# Patient Record
Sex: FEMALE | Race: WHITE | NOT HISPANIC OR LATINO
[De-identification: names, ages, dates, MRNs, and addresses within clinical notes are randomized per-mention and may not be internally consistent; named-entity substitution may affect disease eponyms.]

---

## 2018-10-07 ENCOUNTER — TRANSCRIPTION ENCOUNTER (OUTPATIENT)
Age: 23
End: 2018-10-07

## 2021-10-29 PROBLEM — Z00.00 ENCOUNTER FOR PREVENTIVE HEALTH EXAMINATION: Status: ACTIVE | Noted: 2021-10-29

## 2021-11-01 ENCOUNTER — APPOINTMENT (OUTPATIENT)
Dept: PEDIATRIC CARDIOLOGY | Facility: CLINIC | Age: 26
End: 2021-11-01
Payer: COMMERCIAL

## 2021-11-01 PROCEDURE — 76821 MIDDLE CEREBRAL ARTERY ECHO: CPT

## 2021-11-01 PROCEDURE — 76820 UMBILICAL ARTERY ECHO: CPT

## 2021-11-01 PROCEDURE — 99203 OFFICE O/P NEW LOW 30 MIN: CPT

## 2021-11-01 PROCEDURE — 93325 DOPPLER ECHO COLOR FLOW MAPG: CPT | Mod: 59

## 2021-11-01 PROCEDURE — 76825 ECHO EXAM OF FETAL HEART: CPT

## 2021-11-01 PROCEDURE — 76827 ECHO EXAM OF FETAL HEART: CPT

## 2022-04-03 ENCOUNTER — INPATIENT (INPATIENT)
Facility: HOSPITAL | Age: 27
LOS: 0 days | Discharge: HOME | End: 2022-04-04
Attending: OBSTETRICS & GYNECOLOGY | Admitting: OBSTETRICS & GYNECOLOGY

## 2022-04-03 ENCOUNTER — TRANSCRIPTION ENCOUNTER (OUTPATIENT)
Age: 27
End: 2022-04-03

## 2022-04-03 VITALS — DIASTOLIC BLOOD PRESSURE: 73 MMHG | HEART RATE: 95 BPM | SYSTOLIC BLOOD PRESSURE: 117 MMHG

## 2022-04-03 DIAGNOSIS — K08.409 PARTIAL LOSS OF TEETH, UNSPECIFIED CAUSE, UNSPECIFIED CLASS: Chronic | ICD-10-CM

## 2022-04-03 DIAGNOSIS — Z3A.00 WEEKS OF GESTATION OF PREGNANCY NOT SPECIFIED: ICD-10-CM

## 2022-04-03 DIAGNOSIS — O26.899 OTHER SPECIFIED PREGNANCY RELATED CONDITIONS, UNSPECIFIED TRIMESTER: ICD-10-CM

## 2022-04-03 LAB
BASOPHILS # BLD AUTO: 0.01 K/UL — SIGNIFICANT CHANGE UP (ref 0–0.2)
BASOPHILS NFR BLD AUTO: 0.1 % — SIGNIFICANT CHANGE UP (ref 0–2)
BLD GP AB SCN SERPL QL: NEGATIVE — SIGNIFICANT CHANGE UP
COVID-19 SPIKE DOMAIN AB INTERP: POSITIVE
COVID-19 SPIKE DOMAIN ANTIBODY RESULT: >250 U/ML — HIGH
EOSINOPHIL # BLD AUTO: 0.01 K/UL — SIGNIFICANT CHANGE UP (ref 0–0.5)
EOSINOPHIL NFR BLD AUTO: 0.1 % — SIGNIFICANT CHANGE UP (ref 0–6)
HCT VFR BLD CALC: 34.5 % — SIGNIFICANT CHANGE UP (ref 34.5–45)
HGB BLD-MCNC: 11.4 G/DL — LOW (ref 11.5–15.5)
IANC: 7.62 K/UL — HIGH (ref 1.8–7.4)
IMM GRANULOCYTES NFR BLD AUTO: 0.4 % — SIGNIFICANT CHANGE UP (ref 0–1.5)
LYMPHOCYTES # BLD AUTO: 1.2 K/UL — SIGNIFICANT CHANGE UP (ref 1–3.3)
LYMPHOCYTES # BLD AUTO: 13 % — SIGNIFICANT CHANGE UP (ref 13–44)
MCHC RBC-ENTMCNC: 27.1 PG — SIGNIFICANT CHANGE UP (ref 27–34)
MCHC RBC-ENTMCNC: 33 GM/DL — SIGNIFICANT CHANGE UP (ref 32–36)
MCV RBC AUTO: 81.9 FL — SIGNIFICANT CHANGE UP (ref 80–100)
MONOCYTES # BLD AUTO: 0.38 K/UL — SIGNIFICANT CHANGE UP (ref 0–0.9)
MONOCYTES NFR BLD AUTO: 4.1 % — SIGNIFICANT CHANGE UP (ref 2–14)
NEUTROPHILS # BLD AUTO: 7.62 K/UL — HIGH (ref 1.8–7.4)
NEUTROPHILS NFR BLD AUTO: 82.3 % — HIGH (ref 43–77)
NRBC # BLD: 0 /100 WBCS — SIGNIFICANT CHANGE UP
NRBC # FLD: 0 K/UL — SIGNIFICANT CHANGE UP
PLATELET # BLD AUTO: 167 K/UL — SIGNIFICANT CHANGE UP (ref 150–400)
RBC # BLD: 4.21 M/UL — SIGNIFICANT CHANGE UP (ref 3.8–5.2)
RBC # FLD: 13.9 % — SIGNIFICANT CHANGE UP (ref 10.3–14.5)
RH IG SCN BLD-IMP: POSITIVE — SIGNIFICANT CHANGE UP
RH IG SCN BLD-IMP: POSITIVE — SIGNIFICANT CHANGE UP
SARS-COV-2 IGG+IGM SERPL QL IA: >250 U/ML — HIGH
SARS-COV-2 IGG+IGM SERPL QL IA: POSITIVE
SARS-COV-2 RNA SPEC QL NAA+PROBE: SIGNIFICANT CHANGE UP
WBC # BLD: 9.26 K/UL — SIGNIFICANT CHANGE UP (ref 3.8–10.5)
WBC # FLD AUTO: 9.26 K/UL — SIGNIFICANT CHANGE UP (ref 3.8–10.5)

## 2022-04-03 RX ORDER — OXYCODONE HYDROCHLORIDE 5 MG/1
5 TABLET ORAL ONCE
Refills: 0 | Status: DISCONTINUED | OUTPATIENT
Start: 2022-04-03 | End: 2022-04-04

## 2022-04-03 RX ORDER — ACETAMINOPHEN 500 MG
3 TABLET ORAL
Qty: 0 | Refills: 0 | DISCHARGE
Start: 2022-04-03

## 2022-04-03 RX ORDER — MAGNESIUM HYDROXIDE 400 MG/1
30 TABLET, CHEWABLE ORAL
Refills: 0 | Status: DISCONTINUED | OUTPATIENT
Start: 2022-04-03 | End: 2022-04-04

## 2022-04-03 RX ORDER — OXYTOCIN 10 UNIT/ML
20 VIAL (ML) INJECTION ONCE
Refills: 0 | Status: COMPLETED | OUTPATIENT
Start: 2022-04-03 | End: 2022-04-03

## 2022-04-03 RX ORDER — OXYTOCIN 10 UNIT/ML
333.33 VIAL (ML) INJECTION
Qty: 20 | Refills: 0 | Status: DISCONTINUED | OUTPATIENT
Start: 2022-04-03 | End: 2022-04-04

## 2022-04-03 RX ORDER — HYDROCORTISONE 1 %
1 OINTMENT (GRAM) TOPICAL EVERY 6 HOURS
Refills: 0 | Status: DISCONTINUED | OUTPATIENT
Start: 2022-04-03 | End: 2022-04-04

## 2022-04-03 RX ORDER — OXYCODONE HYDROCHLORIDE 5 MG/1
5 TABLET ORAL
Refills: 0 | Status: DISCONTINUED | OUTPATIENT
Start: 2022-04-03 | End: 2022-04-04

## 2022-04-03 RX ORDER — OXYTOCIN 10 UNIT/ML
VIAL (ML) INJECTION
Qty: 20 | Refills: 0 | Status: DISCONTINUED | OUTPATIENT
Start: 2022-04-03 | End: 2022-04-03

## 2022-04-03 RX ORDER — SODIUM CHLORIDE 9 MG/ML
1000 INJECTION, SOLUTION INTRAVENOUS
Refills: 0 | Status: DISCONTINUED | OUTPATIENT
Start: 2022-04-03 | End: 2022-04-03

## 2022-04-03 RX ORDER — PRAMOXINE HYDROCHLORIDE 150 MG/15G
1 AEROSOL, FOAM RECTAL EVERY 4 HOURS
Refills: 0 | Status: DISCONTINUED | OUTPATIENT
Start: 2022-04-03 | End: 2022-04-04

## 2022-04-03 RX ORDER — DIBUCAINE 1 %
1 OINTMENT (GRAM) RECTAL EVERY 6 HOURS
Refills: 0 | Status: DISCONTINUED | OUTPATIENT
Start: 2022-04-03 | End: 2022-04-04

## 2022-04-03 RX ORDER — KETOROLAC TROMETHAMINE 30 MG/ML
30 SYRINGE (ML) INJECTION ONCE
Refills: 0 | Status: DISCONTINUED | OUTPATIENT
Start: 2022-04-03 | End: 2022-04-04

## 2022-04-03 RX ORDER — ACETAMINOPHEN 500 MG
975 TABLET ORAL
Refills: 0 | Status: DISCONTINUED | OUTPATIENT
Start: 2022-04-03 | End: 2022-04-04

## 2022-04-03 RX ORDER — LANOLIN
1 OINTMENT (GRAM) TOPICAL EVERY 6 HOURS
Refills: 0 | Status: DISCONTINUED | OUTPATIENT
Start: 2022-04-03 | End: 2022-04-04

## 2022-04-03 RX ORDER — BENZOCAINE 10 %
1 GEL (GRAM) MUCOUS MEMBRANE EVERY 6 HOURS
Refills: 0 | Status: DISCONTINUED | OUTPATIENT
Start: 2022-04-03 | End: 2022-04-04

## 2022-04-03 RX ORDER — DIPHENHYDRAMINE HCL 50 MG
25 CAPSULE ORAL EVERY 6 HOURS
Refills: 0 | Status: DISCONTINUED | OUTPATIENT
Start: 2022-04-03 | End: 2022-04-04

## 2022-04-03 RX ORDER — TETANUS TOXOID, REDUCED DIPHTHERIA TOXOID AND ACELLULAR PERTUSSIS VACCINE, ADSORBED 5; 2.5; 8; 8; 2.5 [IU]/.5ML; [IU]/.5ML; UG/.5ML; UG/.5ML; UG/.5ML
0.5 SUSPENSION INTRAMUSCULAR ONCE
Refills: 0 | Status: DISCONTINUED | OUTPATIENT
Start: 2022-04-03 | End: 2022-04-04

## 2022-04-03 RX ORDER — AER TRAVELER 0.5 G/1
1 SOLUTION RECTAL; TOPICAL EVERY 4 HOURS
Refills: 0 | Status: DISCONTINUED | OUTPATIENT
Start: 2022-04-03 | End: 2022-04-04

## 2022-04-03 RX ORDER — SODIUM CHLORIDE 9 MG/ML
3 INJECTION INTRAMUSCULAR; INTRAVENOUS; SUBCUTANEOUS EVERY 8 HOURS
Refills: 0 | Status: DISCONTINUED | OUTPATIENT
Start: 2022-04-03 | End: 2022-04-04

## 2022-04-03 RX ORDER — SIMETHICONE 80 MG/1
80 TABLET, CHEWABLE ORAL EVERY 4 HOURS
Refills: 0 | Status: DISCONTINUED | OUTPATIENT
Start: 2022-04-03 | End: 2022-04-04

## 2022-04-03 RX ORDER — IBUPROFEN 200 MG
600 TABLET ORAL EVERY 6 HOURS
Refills: 0 | Status: DISCONTINUED | OUTPATIENT
Start: 2022-04-03 | End: 2022-04-04

## 2022-04-03 RX ORDER — IBUPROFEN 200 MG
600 TABLET ORAL EVERY 6 HOURS
Refills: 0 | Status: COMPLETED | OUTPATIENT
Start: 2022-04-03 | End: 2023-03-02

## 2022-04-03 RX ORDER — IBUPROFEN 200 MG
1 TABLET ORAL
Qty: 0 | Refills: 0 | DISCHARGE
Start: 2022-04-03

## 2022-04-03 RX ADMIN — Medication 1000 MILLIUNIT(S)/MIN: at 12:55

## 2022-04-03 RX ADMIN — SODIUM CHLORIDE 3 MILLILITER(S): 9 INJECTION INTRAMUSCULAR; INTRAVENOUS; SUBCUTANEOUS at 23:06

## 2022-04-03 RX ADMIN — Medication 20 UNIT(S): at 13:00

## 2022-04-03 RX ADMIN — Medication 975 MILLIGRAM(S): at 19:26

## 2022-04-03 RX ADMIN — SODIUM CHLORIDE 3 MILLILITER(S): 9 INJECTION INTRAMUSCULAR; INTRAVENOUS; SUBCUTANEOUS at 15:34

## 2022-04-03 RX ADMIN — Medication 975 MILLIGRAM(S): at 18:36

## 2022-04-03 NOTE — OB PROVIDER H&P - ASSESSMENT
This is a 26 year old  at 40.3 weeks gestational age admitted for labor    plan discussed with dr skye gregory  admit for labor  approved for epidural prn  routine orders  covid swab collected and sent

## 2022-04-03 NOTE — OB RN DELIVERY SUMMARY - NS_SEPSISRSKCALC_OBGYN_ALL_OB_FT
EOS calculated successfully. EOS Risk Factor: 0.5/1000 live births ([facility] national incidence); GA=40w3d; Temp=98.2; ROM=1.9; GBS='Negative'; Antibiotics='No antibiotics or any antibiotics < 2 hrs prior to birth'

## 2022-04-03 NOTE — OB RN TRIAGE NOTE - FALL HARM RISK - UNIVERSAL INTERVENTIONS
Bed in lowest position, wheels locked, appropriate side rails in place/Call bell, personal items and telephone in reach/Instruct patient to call for assistance before getting out of bed or chair/Non-slip footwear when patient is out of bed/[place] to call system/Physically safe environment - no spills, clutter or unnecessary equipment/Purposeful Proactive Rounding/Room/bathroom lighting operational, light cord in reach

## 2022-04-03 NOTE — CHART NOTE - NSCHARTNOTEFT_GEN_A_CORE
This is a 26 year old  at 40.3 weeks gestational age admitted for labor. Patient does not desire epidural, states " she wants to try without"    O:  SROM clear fluid at 0822  sve 9.5/100/-1  NST: 130 baseline, moderate variability, positive accels, no decels, strong contractions q 2-3 minutes    A/P  Dr Ervin gregory notified  delivery set up in DT2 This is a 26 year old  at 40.3 weeks gestational age admitted for labor. Patient does not desire epidural, states " she wants to try without". Pt laboring with support person at bedside. no VB.    O:  Vital Signs Last 24 Hrs  T(C): 36.8 (2022 07:52), Max: 36.8 (2022 07:52)  T(F): 98.2 (2022 07:52), Max: 98.2 (2022 07:52)  HR: 95 (2022 07:52) (95 - 95)  BP: 117/73 (2022 07:52) (117/73 - 117/73)  BP(mean): --  RR: 14 (2022 07:52) (14 - 18)  SpO2: 98% (2022 07:52) (98% - 98%)    SROM clear fluid at 0822  sve 9.5/100/-1 pt bearing down, instructed not to push before completely dilated  NST: 130 baseline, moderate variability, positive accels, no decels, strong contractions q 2-3 minutes    A/P  Dr Ervin gregory notified  delivery set up in DT2  expectant management  anticipate

## 2022-04-03 NOTE — DISCHARGE NOTE OB - PATIENT PORTAL LINK FT
You can access the FollowMyHealth Patient Portal offered by [facility] by registering at the following website: [email or web address]. By joining [facility]’s FollowMyHealth portal, you will also be able to view your health information using other applications (apps) compatible with our system.

## 2022-04-03 NOTE — OB PROVIDER TRIAGE NOTE - NSHPPHYSICALEXAM_GEN_ALL_CORE
Vital Signs Last 24 Hrs  T(C): 36.4 (03 Apr 2022 06:56), Max: 36.4 (03 Apr 2022 06:56)  T(F): 97.5 (03 Apr 2022 06:56), Max: 97.5 (03 Apr 2022 06:56)  HR: 95 (03 Apr 2022 06:56) (95 - 95)  BP: 117/73 (03 Apr 2022 06:56) (117/73 - 117/73)  BP(mean): --  RR: 18 (03 Apr 2022 06:56) (18 - 18)  SpO2: --    A&O x3  CTAB  abdomen: gravid, soft, nontender  SVE 6/80/-3  NST: 135 baseline, moderate variability, positive accels, no decels  TAS: vtx confirmed

## 2022-04-03 NOTE — DISCHARGE NOTE OB - CARE PROVIDER_API CALL
Meghann Ernst  OBSTETRICS AND GYNECOLOGY  [address], [address]  [place] [address]  Phone: [phone]  Fax: [phone]  Follow Up Time:

## 2022-04-03 NOTE — OB RN PATIENT PROFILE - VISION (WITH CORRECTIVE LENSES IF THE PATIENT USUALLY WEARS THEM):
pt c/o intermittent fever, nose bleed for about 2 months. blood work was done today, called in by PMD for abnormal result. pale appearance. pt is alert, awake and orientedx3. no pmh, IUTD. apical HR auscultated. no medication given PTA. Normal vision: sees adequately in most situations; can see medication labels, newsprint

## 2022-04-03 NOTE — OB PROVIDER DELIVERY SUMMARY - NSSELHIDDEN_OBGYN_ALL_OB_FT
[NS_DeliveryAttending1_OBGYN_ALL_OB_FT:[id number]=],[NS_DeliveryAssist1_OBGYN_ALL_OB_FT:[id number]=]

## 2022-04-03 NOTE — OB RN DELIVERY SUMMARY - NSSELHIDDEN_OBGYN_ALL_OB_FT
[NS_DeliveryAttending1_OBGYN_ALL_OB_FT:[id number]=],[NS_DeliveryAssist1_OBGYN_ALL_OB_FT:[id number]=],[NS_DeliveryRN_OBGYN_ALL_OB_FT:[id number]=],[NS_CirculateRN2_OBGYN_ALL_OB_FT:[id number]=]

## 2022-04-03 NOTE — OB PROVIDER H&P - NS ATTEND AMEND GEN_ALL_CORE FT
Attending Noite    at  40+ weeks presents in active labor   SVE /-1  Admit in labor   epidural prn     R Natividad LORENZO

## 2022-04-03 NOTE — OB PROVIDER TRIAGE NOTE - HISTORY OF PRESENT ILLNESS
This is a 26 year old  at 40.3 weeks gestational age presents with  This is a 26 year old  at 40.3 weeks gestational age presents with painful contractions q 3 minutes, does not require epidural at this time. Reports +GFM, denies LOF, VB.  AP course uncomplicated as per patient  denies recent covid illness, reports + covid vaccination  GBS neg     med: bicuspid aortic valve, being monitored  surg: wisdom teeth  gyn: denies  ob: denies  current meds: none  NKDA

## 2022-04-03 NOTE — OB PROVIDER DELIVERY SUMMARY - NSPROVIDERDELIVERYNOTE_OBGYN_ALL_OB_FT
Spontaneous vaginal delivery of liveborn infant from REUBEN position. Head, shoulders, and body delivered easily. Infant was suctioned. No mec. 1 minute delayed cord clamping was performed. Cord clamped and cut and infant passed to mother. Cord gases obtained. Placenta delivered intact with a 3 vessel cord. Fundal massage was given and uterine fundus was found to be intermittently boggy. Pitocin was run through a pressure bag, intrautine massage performed, and uterus was found to be firm. Vaginal exam revealed an intact cervix, vaginal walls, and sulci. Patient had bilateral periurethral lacerations and a 2nd degree laceration in the perineum that were repaired with 2.0 chromic suture. Excellent hemostasis was noted. Patient was stable and went to recovery. Count was correct x2. Spontaneous vaginal delivery of liveborn infant from REUBEN position. Head, shoulders, and body delivered easily. Infant was suctioned. No mec. 1 minute delayed cord clamping was performed. Cord clamped and cut and infant passed to mother. Cord gases obtained. Placenta delivered intact with a 3 vessel cord. Fundal massage was given and uterine fundus was found to be intermittently boggy. Pitocin was run through a pressure bag, intrauteriine massage performed, and uterus was found to be firm. Vaginal exam revealed an intact cervix, vaginal walls, and sulci. Patient had bilateral periurethral lacerations and a 2nd degree laceration in the perineum that were repaired with 2.0 chromic suture. Excellent hemostasis was noted. Patient was stable and went to recovery. Count was correct x2. Rectal exam intact after delivery

## 2022-04-03 NOTE — OB PROVIDER H&P - HISTORY OF PRESENT ILLNESS
This is a 26 year old  at 40.3 weeks gestational age presents with painful contractions q 3 minutes, does not require epidural at this time. Reports +GFM, denies LOF, VB.  AP course uncomplicated as per patient  denies recent covid illness, reports + covid vaccination  GBS neg     med: bicuspid aortic valve, being monitored  surg: wisdom teeth  gyn: denies  ob: denies  current meds: none  NKDA

## 2022-04-03 NOTE — DISCHARGE NOTE OB - NS MD DC FALL RISK RISK
For information on Fall & Injury Prevention, visit: [email or web address] OR  [email or web address] OR  https://www.cdc.gov/steadi/patient.html

## 2022-04-03 NOTE — DISCHARGE NOTE OB - CARE PLAN
Principal Discharge DX:	Vaginal delivery  Assessment and plan of treatment:	nothing in the vagina x 6 weeks   1

## 2022-04-03 NOTE — DISCHARGE NOTE OB - MEDICATION SUMMARY - MEDICATIONS TO TAKE
I will START or STAY ON the medications listed below when I get home from the hospital:    acetaminophen 325 mg oral tablet  -- 3 tab(s) by mouth every 8 hours  -- Indication: For for pain    ibuprofen 600 mg oral tablet  -- 1 tab(s) by mouth every 6 hours  -- Indication: For for pain    Prenatal Multivitamins with Folic Acid 1 mg oral tablet  -- 1 tab(s) by mouth once a day  -- Indication: For for vitamins

## 2022-04-03 NOTE — OB PROVIDER DELIVERY SUMMARY - NSPPHRISKEVAL_OBGYN_ALL_OB
Active bleeding Over distended uterus (polyhydramnios, macrosomia, multiple gestation)/Large for gestational age /Atonic uterus

## 2022-04-03 NOTE — DISCHARGE NOTE OB - MATERIALS PROVIDED
[facility] [place] Screening Program/[place]  Immunization Record/Breastfeeding Log/Guide to Postpartum Care/[facility] Hearing Screen Program/Shaken Baby Prevention Handout/Birth Certificate Instructions

## 2022-04-03 NOTE — OB RN PATIENT PROFILE - ALERT: PERTINENT HISTORY
1st Trimester Sonogram/20 Week Level II Sonogram Fetal Sonogram/1st Trimester Sonogram/20 Week Level II Sonogram

## 2022-04-04 VITALS
OXYGEN SATURATION: 99 % | RESPIRATION RATE: 17 BRPM | SYSTOLIC BLOOD PRESSURE: 120 MMHG | TEMPERATURE: 98 F | DIASTOLIC BLOOD PRESSURE: 79 MMHG | HEART RATE: 76 BPM

## 2022-04-04 LAB — T PALLIDUM AB TITR SER: NEGATIVE — SIGNIFICANT CHANGE UP

## 2022-04-04 RX ADMIN — Medication 600 MILLIGRAM(S): at 09:11

## 2022-04-04 RX ADMIN — Medication 1 TABLET(S): at 11:56

## 2022-04-04 RX ADMIN — MAGNESIUM HYDROXIDE 30 MILLILITER(S): 400 TABLET, CHEWABLE ORAL at 05:54

## 2022-04-04 RX ADMIN — Medication 975 MILLIGRAM(S): at 11:56

## 2022-04-04 RX ADMIN — Medication 600 MILLIGRAM(S): at 09:54

## 2022-04-04 NOTE — PROGRESS NOTE ADULT - SUBJECTIVE AND OBJECTIVE BOX
S: Patient doing well.   Pain controlled.  +OOB.  +void.  Tolerating PO.  Lochia WNL.    O: Vital Signs Last 24 Hrs  T(C): 36.6 (2022 05:37), Max: 36.7 (2022 18:03)  T(F): 97.9 (2022 05:37), Max: 98.1 (2022 18:03)  HR: 85 (2022 05:37) (85 - 104)  BP: 102/60 (2022 05:37) (102/60 - 116/78)  BP(mean): --  RR: 18 (2022 05:37) (17 - 18)  SpO2: 98% (2022 05:37) (98% - 100%)    Gen: NAD  Abd: soft, NT, ND.   fundus firm below umbilicus, NT.  Ext: no tenderness B/L, negative Deena's sign    Labs:                        11.4   9.26  )-----------( 167      ( 2022 08:21 )             34.5       ABO Interpretation: A ( @ 08:10)    Rh Interpretation: Positive ( @ 08:10)    Antibody Screen Negative            A: 26y PPD#1 s/p  doing well.   -Continue routine postpartum care.  -Discharge planning.     MD Gabe

## 2022-05-06 NOTE — OB PROVIDER H&P - NS ATTEND OPT1A GEN_ALL_CORE
Discharge instructions reviewed with patient and family member. Patient and family verbalized understanding. All home medications have been reviewed, questions answered and patient voiced understanding. All medication side effects reviewed and patient and family verbalized understanding. Follow up appointment(s) reviewed with patient and all attempts made to schedule within 7-10 days of discharge. Patient given prescriptions, discharge instructions, and appointment times. Patient discharged to home with family via private car. Taken to lobby via wheelchair. Medical decision making

## 2023-08-31 NOTE — OB RN TRIAGE NOTE - NS_MODEOFARRIVAL_OBGYN_ALL_OB
Car Pt is a 89 year old Female with a past medical history of Afib on eliquis, MDT PPM, remote CVA 2019,  HTN, CHF, non-hodgkin's lymphoma, and new asthma presents to the ED with worsening SOB and wheezing, found to have sepsis secondary to PNA and afib with RVR.

## 2024-01-18 ENCOUNTER — APPOINTMENT (OUTPATIENT)
Dept: ANTEPARTUM | Facility: CLINIC | Age: 29
End: 2024-01-18
Payer: MEDICARE

## 2024-01-18 ENCOUNTER — APPOINTMENT (OUTPATIENT)
Dept: ANTEPARTUM | Facility: CLINIC | Age: 29
End: 2024-01-18

## 2024-01-18 ENCOUNTER — ASOB RESULT (OUTPATIENT)
Age: 29
End: 2024-01-18

## 2024-01-18 PROCEDURE — 76801 OB US < 14 WKS SINGLE FETUS: CPT

## 2024-01-18 PROCEDURE — 76813 OB US NUCHAL MEAS 1 GEST: CPT

## 2024-03-14 ENCOUNTER — APPOINTMENT (OUTPATIENT)
Dept: ANTEPARTUM | Facility: CLINIC | Age: 29
End: 2024-03-14
Payer: COMMERCIAL

## 2024-03-14 ENCOUNTER — ASOB RESULT (OUTPATIENT)
Age: 29
End: 2024-03-14

## 2024-03-14 PROCEDURE — 76811 OB US DETAILED SNGL FETUS: CPT

## 2024-04-24 PROBLEM — Q23.1 CONGENITAL INSUFFICIENCY OF AORTIC VALVE: Chronic | Status: ACTIVE | Noted: 2022-04-03

## 2024-08-05 ENCOUNTER — INPATIENT (INPATIENT)
Facility: HOSPITAL | Age: 29
LOS: 0 days | Discharge: HOME | End: 2024-08-06
Attending: OBSTETRICS & GYNECOLOGY | Admitting: OBSTETRICS & GYNECOLOGY

## 2024-08-05 ENCOUNTER — APPOINTMENT (OUTPATIENT)
Dept: ANTEPARTUM | Facility: CLINIC | Age: 29
End: 2024-08-05

## 2024-08-05 VITALS — DIASTOLIC BLOOD PRESSURE: 73 MMHG | SYSTOLIC BLOOD PRESSURE: 111 MMHG | HEART RATE: 93 BPM

## 2024-08-05 DIAGNOSIS — O26.899 OTHER SPECIFIED PREGNANCY RELATED CONDITIONS, UNSPECIFIED TRIMESTER: ICD-10-CM

## 2024-08-05 DIAGNOSIS — O42.10 PREMATURE RUPTURE OF MEMBRANES, ONSET OF LABOR MORE THAN 24 HOURS FOLLOWING RUPTURE, UNSPECIFIED WEEKS OF GESTATION: ICD-10-CM

## 2024-08-05 DIAGNOSIS — K08.409 PARTIAL LOSS OF TEETH, UNSPECIFIED CAUSE, UNSPECIFIED CLASS: Chronic | ICD-10-CM

## 2024-08-05 LAB
BASOPHILS # BLD AUTO: 0.01 K/UL — SIGNIFICANT CHANGE UP (ref 0–0.2)
BASOPHILS NFR BLD AUTO: 0.1 % — SIGNIFICANT CHANGE UP (ref 0–2)
BLD GP AB SCN SERPL QL: NEGATIVE — SIGNIFICANT CHANGE UP
EOSINOPHIL # BLD AUTO: 0.01 K/UL — SIGNIFICANT CHANGE UP (ref 0–0.5)
EOSINOPHIL NFR BLD AUTO: 0.1 % — SIGNIFICANT CHANGE UP (ref 0–6)
HCT VFR BLD CALC: 34.5 % — SIGNIFICANT CHANGE UP (ref 34.5–45)
HGB BLD-MCNC: 10.9 G/DL — LOW (ref 11.5–15.5)
IANC: 6.2 K/UL — SIGNIFICANT CHANGE UP (ref 1.8–7.4)
IMM GRANULOCYTES NFR BLD AUTO: 0.4 % — SIGNIFICANT CHANGE UP (ref 0–0.9)
LYMPHOCYTES # BLD AUTO: 1.48 K/UL — SIGNIFICANT CHANGE UP (ref 1–3.3)
LYMPHOCYTES # BLD AUTO: 18.4 % — SIGNIFICANT CHANGE UP (ref 13–44)
MCHC RBC-ENTMCNC: 24.5 PG — LOW (ref 27–34)
MCHC RBC-ENTMCNC: 31.6 GM/DL — LOW (ref 32–36)
MCV RBC AUTO: 77.7 FL — LOW (ref 80–100)
MONOCYTES # BLD AUTO: 0.33 K/UL — SIGNIFICANT CHANGE UP (ref 0–0.9)
MONOCYTES NFR BLD AUTO: 4.1 % — SIGNIFICANT CHANGE UP (ref 2–14)
NEUTROPHILS # BLD AUTO: 6.2 K/UL — SIGNIFICANT CHANGE UP (ref 1.8–7.4)
NEUTROPHILS NFR BLD AUTO: 76.9 % — SIGNIFICANT CHANGE UP (ref 43–77)
NRBC # BLD: 0 /100 WBCS — SIGNIFICANT CHANGE UP (ref 0–0)
NRBC # FLD: 0 K/UL — SIGNIFICANT CHANGE UP (ref 0–0)
PLATELET # BLD AUTO: 197 K/UL — SIGNIFICANT CHANGE UP (ref 150–400)
RBC # BLD: 4.44 M/UL — SIGNIFICANT CHANGE UP (ref 3.8–5.2)
RBC # FLD: 14.6 % — HIGH (ref 10.3–14.5)
RH IG SCN BLD-IMP: POSITIVE — SIGNIFICANT CHANGE UP
WBC # BLD: 8.06 K/UL — SIGNIFICANT CHANGE UP (ref 3.8–10.5)
WBC # FLD AUTO: 8.06 K/UL — SIGNIFICANT CHANGE UP (ref 3.8–10.5)

## 2024-08-05 PROCEDURE — 59409 OBSTETRICAL CARE: CPT | Mod: U9

## 2024-08-05 RX ORDER — OXYTOCIN/RINGER'S LACTATE 20/1000 ML
PLASTIC BAG, INJECTION (ML) INTRAVENOUS
Qty: 30 | Refills: 0 | Status: DISCONTINUED | OUTPATIENT
Start: 2024-08-05 | End: 2024-08-06

## 2024-08-05 RX ORDER — OXYTOCIN/RINGER'S LACTATE 20/1000 ML
333.33 PLASTIC BAG, INJECTION (ML) INTRAVENOUS
Qty: 20 | Refills: 0 | Status: COMPLETED | OUTPATIENT
Start: 2024-08-05 | End: 2024-08-05

## 2024-08-05 RX ORDER — CRANBERRY FRUIT EXTRACT 650 MG
1 CAPSULE ORAL DAILY
Refills: 0 | Status: DISCONTINUED | OUTPATIENT
Start: 2024-08-05 | End: 2024-08-06

## 2024-08-05 RX ORDER — IBUPROFEN 200 MG
600 TABLET ORAL EVERY 6 HOURS
Refills: 0 | Status: COMPLETED | OUTPATIENT
Start: 2024-08-05 | End: 2025-07-04

## 2024-08-05 RX ORDER — DIPHENHYDRAMINE HCL 25 MG
25 CAPSULE ORAL EVERY 6 HOURS
Refills: 0 | Status: DISCONTINUED | OUTPATIENT
Start: 2024-08-05 | End: 2024-08-06

## 2024-08-05 RX ORDER — SIMETHICONE 125 MG/1
80 TABLET, CHEWABLE ORAL EVERY 4 HOURS
Refills: 0 | Status: DISCONTINUED | OUTPATIENT
Start: 2024-08-05 | End: 2024-08-06

## 2024-08-05 RX ORDER — WITCH HAZEL 500 MG/1
1 CLOTH TOPICAL EVERY 4 HOURS
Refills: 0 | Status: DISCONTINUED | OUTPATIENT
Start: 2024-08-05 | End: 2024-08-06

## 2024-08-05 RX ORDER — KETOROLAC TROMETHAMINE 10 MG
30 TABLET ORAL ONCE
Refills: 0 | Status: DISCONTINUED | OUTPATIENT
Start: 2024-08-05 | End: 2024-08-06

## 2024-08-05 RX ORDER — CLOSTRIDIUM TETANI TOXOID ANTIGEN (FORMALDEHYDE INACTIVATED), CORYNEBACTERIUM DIPHTHERIAE TOXOID ANTIGEN (FORMALDEHYDE INACTIVATED), BORDETELLA PERTUSSIS TOXOID ANTIGEN (GLUTARALDEHYDE INACTIVATED), BORDETELLA PERTUSSIS FILAMENTOUS HEMAGGLUTININ ANTIGEN (FORMALDEHYDE INACTIVATED), BORDETELLA PERTUSSIS PERTACTIN ANTIGEN, AND BORDETELLA PERTUSSIS FIMBRIAE 2/3 ANTIGEN 5; 2; 2.5; 5; 3; 5 [LF]/.5ML; [LF]/.5ML; UG/.5ML; UG/.5ML; UG/.5ML; UG/.5ML
0.5 INJECTION, SUSPENSION INTRAMUSCULAR ONCE
Refills: 0 | Status: DISCONTINUED | OUTPATIENT
Start: 2024-08-05 | End: 2024-08-06

## 2024-08-05 RX ORDER — DEXTROSE MONOHYDRATE, SODIUM CHLORIDE, SODIUM LACTATE, CALCIUM CHLORIDE, MAGNESIUM CHLORIDE 1.5; 538; 448; 18.4; 5.08 G/100ML; MG/100ML; MG/100ML; MG/100ML; MG/100ML
1000 SOLUTION INTRAPERITONEAL
Refills: 0 | Status: DISCONTINUED | OUTPATIENT
Start: 2024-08-05 | End: 2024-08-06

## 2024-08-05 RX ORDER — CHLORHEXIDINE GLUCONATE 500 MG/1
1 CLOTH TOPICAL DAILY
Refills: 0 | Status: DISCONTINUED | OUTPATIENT
Start: 2024-08-05 | End: 2024-08-06

## 2024-08-05 RX ORDER — TRISODIUM CITRATE DIHYDRATE AND CITRIC ACID MONOHYDRATE 500; 334 MG/5ML; MG/5ML
15 SOLUTION ORAL EVERY 6 HOURS
Refills: 0 | Status: DISCONTINUED | OUTPATIENT
Start: 2024-08-05 | End: 2024-08-06

## 2024-08-05 RX ORDER — DIBUCAINE 1 %
1 OINTMENT (GRAM) TOPICAL EVERY 6 HOURS
Refills: 0 | Status: DISCONTINUED | OUTPATIENT
Start: 2024-08-05 | End: 2024-08-06

## 2024-08-05 RX ORDER — OXYTOCIN/RINGER'S LACTATE 20/1000 ML
41.67 PLASTIC BAG, INJECTION (ML) INTRAVENOUS
Qty: 20 | Refills: 0 | Status: DISCONTINUED | OUTPATIENT
Start: 2024-08-05 | End: 2024-08-06

## 2024-08-05 RX ORDER — OXYCODONE HYDROCHLORIDE 30 MG/1
5 TABLET ORAL
Refills: 0 | Status: DISCONTINUED | OUTPATIENT
Start: 2024-08-05 | End: 2024-08-06

## 2024-08-05 RX ORDER — BACTERIOSTATIC SODIUM CHLORIDE 0.9 %
3 VIAL (ML) INJECTION EVERY 8 HOURS
Refills: 0 | Status: DISCONTINUED | OUTPATIENT
Start: 2024-08-05 | End: 2024-08-06

## 2024-08-05 RX ORDER — LANOLIN 100 %
1 OINTMENT (GRAM) TOPICAL EVERY 6 HOURS
Refills: 0 | Status: DISCONTINUED | OUTPATIENT
Start: 2024-08-05 | End: 2024-08-06

## 2024-08-05 RX ORDER — ACETAMINOPHEN 500 MG
975 TABLET ORAL
Refills: 0 | Status: DISCONTINUED | OUTPATIENT
Start: 2024-08-05 | End: 2024-08-06

## 2024-08-05 RX ORDER — MAGNESIUM HYDROXIDE 400 MG/5ML
30 SUSPENSION, ORAL (FINAL DOSE FORM) ORAL
Refills: 0 | Status: DISCONTINUED | OUTPATIENT
Start: 2024-08-05 | End: 2024-08-06

## 2024-08-05 RX ORDER — HYDROCORTISONE 1 %
1 CREAM (GRAM) TOPICAL EVERY 6 HOURS
Refills: 0 | Status: DISCONTINUED | OUTPATIENT
Start: 2024-08-05 | End: 2024-08-06

## 2024-08-05 RX ADMIN — Medication 3 MILLILITER(S): at 22:27

## 2024-08-05 NOTE — OB RN PATIENT PROFILE - FALL HARM RISK - UNIVERSAL INTERVENTIONS
Bed in lowest position, wheels locked, appropriate side rails in place/Call bell, personal items and telephone in reach/Instruct patient to call for assistance before getting out of bed or chair/Non-slip footwear when patient is out of bed/[place] to call system/Physically safe environment - no spills, clutter or unnecessary equipment/Purposeful Proactive Rounding/Room/bathroom lighting operational, light cord in reach

## 2024-08-05 NOTE — OB PROVIDER H&P - ATTENDING COMMENTS
OB Attending Note    Agree w/ above.   P1 presents with PROM.  For pitocin augmentation.     GRABIEL Sanchez MD

## 2024-08-05 NOTE — OB PROVIDER DELIVERY SUMMARY - NS_ADMSROM_OBGYN_ALL_OB_DT
TEAM [ B ] Surgery Daily Progress Note  =====================================================    SUBJECTIVE: **    PAST MEDICAL & SURGICAL HISTORY:  DM (diabetes mellitus)  HTN (hypertension)  HLD (hyperlipidemia)  CHF (congestive heart failure)  CKD (chronic kidney disease)  PVD (peripheral vascular disease)  S/P femoral-femoral bypass surgery  2018 - right leg    ALLERGIES:  penicillin (Other)    --------------------------------------------------------------------------------------    MEDICATIONS:    Neurologic Medications  acetaminophen   Tablet .. 975 milliGRAM(s) Oral every 6 hours  HYDROmorphone  Injectable 0.5 milliGRAM(s) IV Push every 4 hours PRN breakthrough  melatonin 3 milliGRAM(s) Oral at bedtime PRN Insomnia  oxyCODONE    IR 10 milliGRAM(s) Oral every 4 hours PRN Severe Pain (7 - 10)  oxyCODONE    IR 5 milliGRAM(s) Oral every 4 hours PRN Moderate Pain (4 - 6)    Cardiovascular Medications  carvedilol 25 milliGRAM(s) Oral every 12 hours  lisinopril 10 milliGRAM(s) Oral daily    Gastrointestinal Medications  dextrose 5%. 1000 milliLiter(s) IV Continuous <Continuous>  dextrose 5%. 1000 milliLiter(s) IV Continuous <Continuous>  multivitamin 1 Tablet(s) Oral daily    Hematologic/Oncologic Medications  aspirin enteric coated 81 milliGRAM(s) Oral daily  heparin   Injectable 5000 Unit(s) SubCutaneous every 8 hours  influenza   Vaccine 0.5 milliLiter(s) IntraMuscular once    Endocrine/Metabolic Medications  atorvastatin 40 milliGRAM(s) Oral at bedtime  dextrose 40% Gel 15 Gram(s) Oral once  dextrose 50% Injectable 25 Gram(s) IV Push once  dextrose 50% Injectable 12.5 Gram(s) IV Push once  dextrose 50% Injectable 25 Gram(s) IV Push once  glucagon  Injectable 1 milliGRAM(s) IntraMuscular once  insulin glargine Injectable (LANTUS) 10 Unit(s) SubCutaneous at bedtime  insulin lispro (ADMELOG) corrective regimen sliding scale   SubCutaneous three times a day before meals  insulin lispro (ADMELOG) corrective regimen sliding scale   SubCutaneous at bedtime  insulin lispro Injectable (ADMELOG) 3 Unit(s) SubCutaneous three times a day before meals    Topical/Other Medications  artificial  tears Solution 2 Drop(s) Left EYE two times a day  cadexomer iodine 0.9% Gel 1 Application(s) Topical daily    --------------------------------------------------------------------------------------    VITAL SIGNS:  ICU Vital Signs Last 24 Hrs  T(C): 36.8 (19 Oct 2021 09:36), Max: 36.8 (19 Oct 2021 06:30)  T(F): 98.2 (19 Oct 2021 09:36), Max: 98.2 (19 Oct 2021 06:30)  HR: 85 (19 Oct 2021 09:36) (78 - 85)  BP: 106/67 (19 Oct 2021 09:36) (106/67 - 134/75)  BP(mean): --  ABP: --  ABP(mean): --  RR: 17 (19 Oct 2021 09:36) (16 - 17)  SpO2: 100% (19 Oct 2021 09:36) (100% - 100%)    --------------------------------------------------------------------------------------    EXAM    General: NAD, resting in bed comfortably.        --------------------------------------------------------------------------------------    LABS                          11.0   12.60 )-----------( 694      ( 19 Oct 2021 06:53 )             32.4   10-19    134<L>  |  99  |  33<H>  ----------------------------<  163<H>  4.7   |  26  |  0.85    Ca    9.0      19 Oct 2021 06:53  Phos  3.3     10-19  Mg     2.10     10-19        --------------------------------------------------------------------------------------    INS AND OUTS:    I&O's Detail    18 Oct 2021 07:01  -  19 Oct 2021 07:00  --------------------------------------------------------  IN:  Total IN: 0 mL    OUT:    Voided (mL): 750 mL  Total OUT: 750 mL    Total NET: -750 mL        -------------------------------------------------------------------------------------- TEAM [ B ] Surgery Daily Progress Note  =====================================================    SUBJECTIVE: Patient seen and examined at bedside with no complaints.    PAST MEDICAL & SURGICAL HISTORY:  DM (diabetes mellitus)  HTN (hypertension)  HLD (hyperlipidemia)  CHF (congestive heart failure)  CKD (chronic kidney disease)  PVD (peripheral vascular disease)  S/P femoral-femoral bypass surgery  2018 - right leg    ALLERGIES:  penicillin (Other)    --------------------------------------------------------------------------------------    MEDICATIONS:    Neurologic Medications  acetaminophen   Tablet .. 975 milliGRAM(s) Oral every 6 hours  HYDROmorphone  Injectable 0.5 milliGRAM(s) IV Push every 4 hours PRN breakthrough  melatonin 3 milliGRAM(s) Oral at bedtime PRN Insomnia  oxyCODONE    IR 10 milliGRAM(s) Oral every 4 hours PRN Severe Pain (7 - 10)  oxyCODONE    IR 5 milliGRAM(s) Oral every 4 hours PRN Moderate Pain (4 - 6)    Cardiovascular Medications  carvedilol 25 milliGRAM(s) Oral every 12 hours  lisinopril 10 milliGRAM(s) Oral daily    Gastrointestinal Medications  dextrose 5%. 1000 milliLiter(s) IV Continuous <Continuous>  dextrose 5%. 1000 milliLiter(s) IV Continuous <Continuous>  multivitamin 1 Tablet(s) Oral daily    Hematologic/Oncologic Medications  aspirin enteric coated 81 milliGRAM(s) Oral daily  heparin   Injectable 5000 Unit(s) SubCutaneous every 8 hours  influenza   Vaccine 0.5 milliLiter(s) IntraMuscular once    Endocrine/Metabolic Medications  atorvastatin 40 milliGRAM(s) Oral at bedtime  dextrose 40% Gel 15 Gram(s) Oral once  dextrose 50% Injectable 25 Gram(s) IV Push once  dextrose 50% Injectable 12.5 Gram(s) IV Push once  dextrose 50% Injectable 25 Gram(s) IV Push once  glucagon  Injectable 1 milliGRAM(s) IntraMuscular once  insulin glargine Injectable (LANTUS) 10 Unit(s) SubCutaneous at bedtime  insulin lispro (ADMELOG) corrective regimen sliding scale   SubCutaneous three times a day before meals  insulin lispro (ADMELOG) corrective regimen sliding scale   SubCutaneous at bedtime  insulin lispro Injectable (ADMELOG) 3 Unit(s) SubCutaneous three times a day before meals    Topical/Other Medications  artificial  tears Solution 2 Drop(s) Left EYE two times a day  cadexomer iodine 0.9% Gel 1 Application(s) Topical daily    --------------------------------------------------------------------------------------    VITAL SIGNS:  ICU Vital Signs Last 24 Hrs  T(C): 36.8 (19 Oct 2021 09:36), Max: 36.8 (19 Oct 2021 06:30)  T(F): 98.2 (19 Oct 2021 09:36), Max: 98.2 (19 Oct 2021 06:30)  HR: 85 (19 Oct 2021 09:36) (78 - 85)  BP: 106/67 (19 Oct 2021 09:36) (106/67 - 134/75)  BP(mean): --  ABP: --  ABP(mean): --  RR: 17 (19 Oct 2021 09:36) (16 - 17)  SpO2: 100% (19 Oct 2021 09:36) (100% - 100%)    --------------------------------------------------------------------------------------    EXAM    General: NAD, resting in bed comfortably.  Ext: R AKA incision healing well c/d/i        --------------------------------------------------------------------------------------    LABS                          11.0   12.60 )-----------( 694      ( 19 Oct 2021 06:53 )             32.4   10-19    134<L>  |  99  |  33<H>  ----------------------------<  163<H>  4.7   |  26  |  0.85    Ca    9.0      19 Oct 2021 06:53  Phos  3.3     10-19  Mg     2.10     10-19        --------------------------------------------------------------------------------------    INS AND OUTS:    I&O's Detail    18 Oct 2021 07:01  -  19 Oct 2021 07:00  --------------------------------------------------------  IN:  Total IN: 0 mL    OUT:    Voided (mL): 750 mL  Total OUT: 750 mL    Total NET: -750 mL        -------------------------------------------------------------------------------------- 05-Aug-2024 10:30

## 2024-08-05 NOTE — OB RN DELIVERY SUMMARY - NSSELHIDDEN_OBGYN_ALL_OB_FT
[NS_DeliveryAttending1_OBGYN_ALL_OB_FT:[id number]],[NS_DeliveryRN_OBGYN_ALL_OB_FT:[id number]]

## 2024-08-05 NOTE — OB RN DELIVERY SUMMARY - NS_SEPSISRSKCALC_OBGYN_ALL_OB_FT
EOS calculated successfully. EOS Risk Factor: 0.5/1000 live births ([facility] national incidence); GA=41w1d; Temp=98.6; ROM=10.35; GBS='Negative'; Antibiotics='No antibiotics or any antibiotics < 2 hrs prior to birth'

## 2024-08-05 NOTE — OB PROVIDER DELIVERY SUMMARY - NSSELHIDDEN_OBGYN_ALL_OB_FT
[NS_DeliveryAttending1_OBGYN_ALL_OB_FT:[id number]],[NS_DeliveryAssist1_OBGYN_ALL_OB_FT:[id number]=]

## 2024-08-05 NOTE — OB PROVIDER H&P - HISTORY OF PRESENT ILLNESS
HPI: 29y/o  at 41+1wks  presenting PROM clear at 10:30am which was confirmed in her routine office visit today. . Pt denies regular ctx or VB. Reports +FM.     AP course: Dr Henson. AP c/b uncomplicated. GBS neagtive. Last EFW extrapolated from US 1wk ago     OBHx:  SABx1 at 10wks (uncomplicated)  4/3/22 FT  9lb7oz uncomplicated    Gyn Hx:  - Denies     PMH:   - bicuspid aortic valve    SHx:  - Denies    Psych/soc: denies, denies toxic habits x3, feels safe at home    Medications: Mineral Resin    Allergies: NKDA    Will Accept blood transfusion- Yes

## 2024-08-05 NOTE — OB PROVIDER DELIVERY SUMMARY - NSPROVIDERDELIVERYNOTE_OBGYN_ALL_OB_FT
Patient was found to be fully dilated and directed to push with contractions.  Spontaneous vaginal delivery of liveborn male  Head, shoulders and body delivered easily, infant passed to mother.  Cord was delayed until chord was white and pulseless. Cord was cut.  Placenta delivered spontaneously intact. Uterine massage was performed.  Vaginal walls, sulci, and cervix examined and noted to be intact.  Fundus was firm.   Good hemostasis was noted.  Count correct x2. Patient was found to be fully dilated and directed to push with contractions.  Pushed and delivered on all fours.  Spontaneous vaginal delivery of liveborn male  Head, shoulders and body delivered easily, infant passed to mother.  Cord was delayed until chord was white and pulseless per patient preference. Cord was cut.  Cord gases were sent.  Placenta delivered spontaneously intact. Uterine massage was performed until good tone noted.  Pt refused postpartum pitocin.  Vaginal walls, sulci, and cervix examined and noted to be intact.  Fundus was firm.   Good hemostasis was noted.  Count correct x2.

## 2024-08-05 NOTE — OB RN PATIENT PROFILE - NSMATERNALFETALCONCERNS_OBGYN_ALL_OB_FT
FETAL ALERT  3.22.22  Normal fetal echocardiogram.  The aortic valve morphology cannot be seen well in fetal echocardiogram. Echocardiogram is recommended after baby is born anytime after 6 months of age to evaluate aortic valve.  Luis Frost rn,msn

## 2024-08-05 NOTE — OB PROVIDER H&P - NSLOWPPHRISK_OBGYN_A_OB
No previous uterine incision/Veronica Pregnancy/Less than or equal to 4 previous vaginal births/No known bleeding disorder/No history of postpartum hemorrhage/No other PPH risks indicated

## 2024-08-05 NOTE — OB PROVIDER H&P - NSHPPHYSICALEXAM_GEN_ALL_CORE
Vital Signs Last 24 Hrs  T(C): --  T(F): --  HR: 93 (05 Aug 2024 12:10) (93 - 93)  BP: 111/73 (05 Aug 2024 12:10) (111/73 - 111/73)  BP(mean): --  RR: --  SpO2: --      Physical Exam:  Gen: NAD, AOx3  CV: RR  Resp: unlabored respirations  Abd: soft, NT, gravid, EFW 3800g on leopalds  Extremities: not edemeatous    Speculum Exam: deferred as ROM was confirmed today by pt's attending in her routine office visit    SVE: 3.5/50/-3    FHT: , moderate variability, +accels, -decels, cat 1  [place]: irregular    Sono: Vtx

## 2024-08-05 NOTE — OB PROVIDER H&P - ASSESSMENT
A/P: 27y/o  at 41+1wks with PROM.     - Admit   - Consents  - IV, Labs, IV fluids  - Clear liquid diet  - 4 pitocin  - GBS negative  - Pain: IV pain meds/epidural PRN    Risks, benefits, alternatives, and possible complications have been discussed in detail with the patient in her native language. Pre-admission, admission, and post admission procedures and expectations were discussed in detail. All questions answered, all appropriate hospital consents were signed. Anticipate normal vaginal delivery.    Informed consent was obtained. The following was discussed:  - Induction/augmentation of labor: use of medication and/or cook balloon to begin or enhance labor  - Obstetrical management including internal fetal/contraction monitoring  - Normal vaginal delivery  - Possible  section      Discussed w/ Dr Sanchez.   Camilla Patricia CNM

## 2024-08-05 NOTE — OB PROVIDER H&P - NSCORESITESY/N_GEN_A_CORE_RD
No
nausea vomiting during pregnancy, COVID (+), recovering from symptoms, f/u labs, US OB, iv fluids, anti-emetics

## 2024-08-06 ENCOUNTER — TRANSCRIPTION ENCOUNTER (OUTPATIENT)
Age: 29
End: 2024-08-06

## 2024-08-06 VITALS
RESPIRATION RATE: 18 BRPM | TEMPERATURE: 99 F | OXYGEN SATURATION: 99 % | SYSTOLIC BLOOD PRESSURE: 115 MMHG | DIASTOLIC BLOOD PRESSURE: 70 MMHG | HEART RATE: 97 BPM

## 2024-08-06 LAB
HCT VFR BLD CALC: 27.2 % — LOW (ref 34.5–45)
HCT VFR BLD CALC: 29.7 % — LOW (ref 34.5–45)
HGB BLD-MCNC: 8.6 G/DL — LOW (ref 11.5–15.5)
HGB BLD-MCNC: 9.3 G/DL — LOW (ref 11.5–15.5)
MCHC RBC-ENTMCNC: 25 PG — LOW (ref 27–34)
MCHC RBC-ENTMCNC: 25.1 PG — LOW (ref 27–34)
MCHC RBC-ENTMCNC: 31.3 GM/DL — LOW (ref 32–36)
MCHC RBC-ENTMCNC: 31.6 GM/DL — LOW (ref 32–36)
MCV RBC AUTO: 79.1 FL — LOW (ref 80–100)
MCV RBC AUTO: 80.3 FL — SIGNIFICANT CHANGE UP (ref 80–100)
NRBC # BLD: 0 /100 WBCS — SIGNIFICANT CHANGE UP (ref 0–0)
NRBC # BLD: 0 /100 WBCS — SIGNIFICANT CHANGE UP (ref 0–0)
NRBC # FLD: 0 K/UL — SIGNIFICANT CHANGE UP (ref 0–0)
NRBC # FLD: 0 K/UL — SIGNIFICANT CHANGE UP (ref 0–0)
PLATELET # BLD AUTO: 162 K/UL — SIGNIFICANT CHANGE UP (ref 150–400)
PLATELET # BLD AUTO: 184 K/UL — SIGNIFICANT CHANGE UP (ref 150–400)
RBC # BLD: 3.44 M/UL — LOW (ref 3.8–5.2)
RBC # BLD: 3.7 M/UL — LOW (ref 3.8–5.2)
RBC # FLD: 14.4 % — SIGNIFICANT CHANGE UP (ref 10.3–14.5)
RBC # FLD: 14.5 % — SIGNIFICANT CHANGE UP (ref 10.3–14.5)
T PALLIDUM AB TITR SER: NEGATIVE — SIGNIFICANT CHANGE UP
WBC # BLD: 11.22 K/UL — HIGH (ref 3.8–10.5)
WBC # BLD: 11.74 K/UL — HIGH (ref 3.8–10.5)
WBC # FLD AUTO: 11.22 K/UL — HIGH (ref 3.8–10.5)
WBC # FLD AUTO: 11.74 K/UL — HIGH (ref 3.8–10.5)

## 2024-08-06 RX ORDER — DEXTROSE MONOHYDRATE, SODIUM CHLORIDE, SODIUM LACTATE, CALCIUM CHLORIDE, MAGNESIUM CHLORIDE 1.5; 538; 448; 18.4; 5.08 G/100ML; MG/100ML; MG/100ML; MG/100ML; MG/100ML
500 SOLUTION INTRAPERITONEAL ONCE
Refills: 0 | Status: COMPLETED | OUTPATIENT
Start: 2024-08-06 | End: 2024-08-06

## 2024-08-06 RX ORDER — TRANEXAMIC ACID 650 MG/1
1000 TABLET ORAL ONCE
Refills: 0 | Status: COMPLETED | OUTPATIENT
Start: 2024-08-06 | End: 2024-08-06

## 2024-08-06 RX ORDER — IBUPROFEN 200 MG
600 TABLET ORAL EVERY 6 HOURS
Refills: 0 | Status: DISCONTINUED | OUTPATIENT
Start: 2024-08-06 | End: 2024-08-06

## 2024-08-06 RX ADMIN — TRANEXAMIC ACID 220 MILLIGRAM(S): 650 TABLET ORAL at 00:18

## 2024-08-06 RX ADMIN — Medication 600 MILLIGRAM(S): at 23:11

## 2024-08-06 RX ADMIN — Medication 125 MILLIUNIT(S)/MIN: at 00:19

## 2024-08-06 RX ADMIN — Medication 600 MILLIGRAM(S): at 06:56

## 2024-08-06 RX ADMIN — Medication 1000 MILLIUNIT(S)/MIN: at 00:19

## 2024-08-06 RX ADMIN — Medication 600 MILLIGRAM(S): at 07:30

## 2024-08-06 RX ADMIN — DEXTROSE MONOHYDRATE, SODIUM CHLORIDE, SODIUM LACTATE, CALCIUM CHLORIDE, MAGNESIUM CHLORIDE 500 MILLILITER(S): 1.5; 538; 448; 18.4; 5.08 SOLUTION INTRAPERITONEAL at 01:00

## 2024-08-06 RX ADMIN — Medication 3 MILLILITER(S): at 06:20

## 2024-08-06 NOTE — PROGRESS NOTE ADULT - SUBJECTIVE AND OBJECTIVE BOX
S: Patient doing well. Minimal lochia. Pain controlled.    O: Vital Signs Last 24 Hrs  T(C): 36.6 (06 Aug 2024 09:42), Max: 37.0 (05 Aug 2024 20:00)  T(F): 97.9 (06 Aug 2024 09:42), Max: 98.6 (05 Aug 2024 20:00)  HR: 82 (06 Aug 2024 06:13) (74 - 136)  BP: 96/55 (06 Aug 2024 06:13) (94/54 - 119/55)  RR: 18 (06 Aug 2024 09:42) (14 - 18)  SpO2: 100% (06 Aug 2024 09:42) (99% - 100%)    Parameters below as of 06 Aug 2024 09:42  Patient On (Oxygen Delivery Method): room air        Gen: NAD  Abd: soft, NT, ND, fundus firm below umbilicus  Lochia: moderate  Ext: no tenderness    Labs:                        9.3    11.22 )-----------( 184      ( 06 Aug 2024 07:10 )             29.7       ABO Interpretation: A ( @ 13:04)    Rh Interpretation: Positive ( @ 13:04)    Antibody Screen Negative      A: 28y PPD#1 s/p  doing well.   feeling well. no complaints  Plan: Continue routine postpartum care.             Anticipate d/c home.

## 2024-08-06 NOTE — DISCHARGE NOTE OB - PATIENT PORTAL LINK FT
You can access the FollowMyHealth Patient Portal offered by [facility] by registering at the following website: [email or web address]. By joining [employer]’s FollowMyHealth portal, you will also be able to view your health information using other applications (apps) compatible with our system.

## 2024-08-06 NOTE — PROVIDER CONTACT NOTE (OTHER) - ASSESSMENT
vitals WDL  pt expressing fist sized clots and is heavily bleeding  pt refusing post partum pitocin at this time
First two heel sticks 57 and 60  Mother of pt refusing last heel stick

## 2024-08-06 NOTE — PROVIDER CONTACT NOTE (OTHER) - SITUATION
s/p , viable male, APGARS 8/9  vitals WDL  pt expressing fist sized clots and is heavily bleeding  pt refusing post partum pitocin at this time

## 2024-08-06 NOTE — PROVIDER CONTACT NOTE (OTHER) - SITUATION
, APGARs 8/9 for color  LGA at 41.1 wks, 4060g 8 lb 15 oz  First two heel sticks 57 and 60  Mother of pt refusing last heel stick

## 2024-08-06 NOTE — CHART NOTE - NSCHARTNOTEFT_GEN_A_CORE
OB Attending    Pt seen at bedside.  Bleeding improved, normal lochia.  now s/p TXA, and pitocin bolus.  pitocin maintenance running.  Receiving IVF bolus.                          8.6    11.74 )-----------( 162      ( 06 Aug 2024 00:25 )             27.2     Hb 10.9 to 8.6, currently appropriate for EBL, although likely will equilibrate lower.  Plan for repeat CBC in AM.    Repeat orthostatic BPs prior to clearance for PP floor.    MD Gbae
R4 PPH Note    Pt seen at bedside following report from MD Bean of increased bleeding on fundal exam.    BSUS with small volume clot in JESSIKA, no doppler flow. Pt tolerated bimanual exam well with removal of clots and decreased bleeding. Total EBL since delivery 804cc. Pt feels well.    Discussed medication with pt - previously declined postpartum pit, continues to decline. Discussed potential need for medication/uterotonics in future if she continues to have bleeding. Pt understanding.    Elif Devine  PGY4
OB Attending    Pt seen at bedside after passage of another clot.  Pt had declined postpartum pitocin at time of delivery.  Seen by PGY4 Dr. Devine approximately 30 min ago and had evacuation of clot with bimanual exam.  Pt again refused uterotonics at that time.  Pt now has failed orthostatics.      Seen at bedside. Discussed concern re: ongoing bleeding, need for uterotonics to allow for uterus to contract down to stop bleeding.    Also discussed use of TXA, which can further reduce bleeding.  Discussed need for CBC at this time, and that medications recommended are to prevent further bleeding and to hopefully prevent need for blood transfusion.    After discussion, pt now in agreement for medications.  Pitocin bolus initiated.  For TXA.  Stat CBC.     MD Gabe
OB Attending Labor Note    Pt seen and examined at bedside.    T(C): 36.8 (08-05-24 @ 17:00), Max: 36.8 (08-05-24 @ 17:00)  HR: 92 (08-05-24 @ 17:00) (92 - 108)  BP: 113/56 (08-05-24 @ 17:00) (111/73 - 114/74)  RR: 14 (08-05-24 @ 17:00) (14 - 18)      FHT:  moderate variability, accels.  Hawkins: q2-3min  VE: 4-5/70/-2, with forebag.    AROM of forebag with pt permission.  Scant clear fluid.  Continue pitocin.    MD Gabe
OB Attending Note    patient seen and evaluated at bedside.   denies complaints.  on pitocin for augmentation.   allowing increase in pitocin infrequently.  discussed recommendation for utilizing pitocin per our guideline (increase q20 mins if FHR tracing allows) for expedition of IOL in setting of PROM.   Discussed increased risks of chorioamionitis, endometritis, low apgar scores, NICU admission in setting of prolonged PROM.   Questions re: pitocin and titration of pitocin answered.   Patient agreeable to increase pitocin q30 mins.   Questions about PP pitocin and role in PP hemorrhage prevention answered.   NST category 1.     Continue to monitor and continue pitocin augmentation.     GRABIEL Sanchez MD

## 2025-07-04 ENCOUNTER — EMERGENCY (EMERGENCY)
Facility: HOSPITAL | Age: 30
LOS: 1 days | End: 2025-07-04
Attending: EMERGENCY MEDICINE
Payer: COMMERCIAL

## 2025-07-04 VITALS
TEMPERATURE: 98 F | SYSTOLIC BLOOD PRESSURE: 119 MMHG | HEIGHT: 68 IN | DIASTOLIC BLOOD PRESSURE: 80 MMHG | HEART RATE: 90 BPM | WEIGHT: 139.99 LBS | RESPIRATION RATE: 18 BRPM | OXYGEN SATURATION: 97 %

## 2025-07-04 RX ORDER — TETRACAINE HYDROCHLORIDE 5 MG/ML
1 SOLUTION OPHTHALMIC ONCE
Refills: 0 | Status: DISCONTINUED | OUTPATIENT
Start: 2025-07-04 | End: 2025-07-04

## 2025-07-04 RX ORDER — FLUORESCEIN SODIUM 0.6 MG
1 STRIP OPHTHALMIC (EYE) ONCE
Refills: 0 | Status: COMPLETED | OUTPATIENT
Start: 2025-07-04 | End: 2025-07-04

## 2025-07-04 RX ADMIN — Medication 1 APPLICATION(S): at 13:07

## 2025-07-04 NOTE — ED PROVIDER NOTE - NSTIMEPROVIDERCAREINITIATE_GEN_ER
Spoke to patient.   Patient states that they already talked to Dr. Nowak's office and that the nurse said it was ok to change the date.   She is going to call Eliu's office again to confirm.      04-Jul-2025 11:24

## 2025-07-04 NOTE — ED PROVIDER NOTE - PHYSICAL EXAMINATION
NAD. VSS. Afebrile. +Unequal pupils (R-3, L-2.5), Reactive to light  bilaterally. +Right lateral and lower lid ecchymosis with mild tender. Neck supple. Lungs clear. Neuro intact. no
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
## 2025-07-04 NOTE — ED PROVIDER NOTE - NSFOLLOWUPINSTRUCTIONS_ED_ALL_ED_FT
YOU WERE SEEN IN THE ED FOR: right eye pain, blurred vision after trauma    WHILE YOU WERE HERE, YOU HAD: a CT of your maxillofacial area.  You did not wish to wait for the results of your CT scan.  As discussed, you are leaving the hospital against medical advice as we do not have the results.  Leaving without results means we cannot tell you if there is a vision threatening/eye threatening injury.  We discussed that leaving against medical advice could result in loss of vision, permanent disability and in extreme cases, death.  You reported understanding this risk and wished to leave against medical advice anyway.    FOR PAIN, YOU MAY TAKE TYLENOL (ACETAMINOPHEN).  FOLLOW THE INSTRUCTIONS ON THE LABEL/CONTAINER.  DO NOT EXCEED 4000MG OF TYLENOL (ACETAMINOPHEN) IN A 24 HOUR PERIOD.    PLEASE FOLLOW UP WITH YOUR PRIVATE PHYSICIAN AND OPHTHALMOLOGIST IMMEDIATELY.  BRING COPIES OF YOUR RESULTS/DISCHARGE PAPERS.    RETURN TO THE EMERGENCY DEPARTMENT IF YOU EXPERIENCE ANY NEW/CONCERNING/WORSENING SYMPTOMS SUCH AS BUT NOT LIMITED TO: change in vision, double vision, increased pain, bleeding or purulence from eye, severe headache, or any other concerns.

## 2025-07-04 NOTE — ED PROVIDER NOTE - PROGRESS NOTE4
Pt discharged via cab, Iv removed. Tele removed/ all personal belongings sent with patient. Discharge paperwork reviewed and all questions answered.   Electronically signed by Yamilet Marie RN on 1/20/2024 at 2:11 PM    Stable.
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
## 2025-07-04 NOTE — ED PROVIDER NOTE - PATIENT PORTAL LINK FT
You can access the FollowMyHealth Patient Portal offered by [facility] by registering at the following website: [email or web address]. By joining [employer]’s FollowMyHealth portal, you will also be able to view your health information using other applications (apps) compatible with our system.
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
